# Patient Record
Sex: FEMALE | Race: OTHER
[De-identification: names, ages, dates, MRNs, and addresses within clinical notes are randomized per-mention and may not be internally consistent; named-entity substitution may affect disease eponyms.]

---

## 2018-01-27 ENCOUNTER — HOSPITAL ENCOUNTER (EMERGENCY)
Dept: HOSPITAL 25 - ED | Age: 28
Discharge: HOME | End: 2018-01-27
Payer: COMMERCIAL

## 2018-01-27 VITALS — SYSTOLIC BLOOD PRESSURE: 126 MMHG | DIASTOLIC BLOOD PRESSURE: 80 MMHG
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
## 2018-01-27 DIAGNOSIS — S82.301A: Primary | ICD-10-CM

## 2018-01-27 DIAGNOSIS — S82.831A: ICD-10-CM

## 2018-01-27 LAB
BASOPHILS # BLD AUTO: 0 10^3/UL (ref 0–0.2)
EOSINOPHIL # BLD AUTO: 0 10^3/UL (ref 0–0.6)
HCT VFR BLD AUTO: 43 % (ref 35–47)
HGB BLD-MCNC: 14.1 G/DL (ref 12–16)
LYMPHOCYTES # BLD AUTO: 1.8 10^3/UL (ref 1–4.8)
MCH RBC QN AUTO: 29 PG (ref 27–31)
MCHC RBC AUTO-ENTMCNC: 33 G/DL (ref 31–36)
MCV RBC AUTO: 88 FL (ref 80–97)
MONOCYTES # BLD AUTO: 0.6 10^3/UL (ref 0–0.8)
NEUTROPHILS # BLD AUTO: 9.3 10^3/UL (ref 1.5–7.7)
NRBC # BLD AUTO: 0 10^3/UL
NRBC BLD QL AUTO: 0
PLATELET # BLD AUTO: 213 10^3/UL (ref 150–450)
RBC # BLD AUTO: 4.83 10^6/UL (ref 4–5.4)
WBC # BLD AUTO: 11.7 10^3/UL (ref 3.5–10.8)

## 2018-01-27 PROCEDURE — 80053 COMPREHEN METABOLIC PANEL: CPT

## 2018-01-27 PROCEDURE — 27824 TREAT LOWER LEG FRACTURE: CPT

## 2018-01-27 PROCEDURE — 36415 COLL VENOUS BLD VENIPUNCTURE: CPT

## 2018-01-27 PROCEDURE — 96374 THER/PROPH/DIAG INJ IV PUSH: CPT

## 2018-01-27 PROCEDURE — 96375 TX/PRO/DX INJ NEW DRUG ADDON: CPT

## 2018-01-27 PROCEDURE — 85025 COMPLETE CBC W/AUTO DIFF WBC: CPT

## 2018-01-27 PROCEDURE — 99283 EMERGENCY DEPT VISIT LOW MDM: CPT

## 2018-01-27 PROCEDURE — 96376 TX/PRO/DX INJ SAME DRUG ADON: CPT

## 2018-01-27 NOTE — RAD
HISTORY: Right ankle pain, right leg deformity, trauma



COMPARISONS: None



VIEWS: 6, Frontal, lateral, and oblique views of the right ankle with frontal and lateral

views of the right foreleg



FINDINGS:



BONE DENSITY: There is an oblique, comminuted, and angulated fracture of the distal tibial

metaphysis with articular extension. There is transverse fracture through the distal

fibula. The tibial fibular interval is normal. There is no oblique displaced fracture of

the proximal fibula.

BONES: There is no displaced fracture.    

JOINTS: There is no arthropathy.    

ALIGNMENT: There is no dislocation. 

SOFT TISSUES: Unremarkable.



OTHER FINDINGS: None.



IMPRESSION: 

1.  OBLIQUE COMMINUTED ANGULATED FRACTURE OF THE DISTAL TIBIA WITH ARTICULAR EXTENSION.

2.  NONDISPLACED FRACTURE OF THE DISTAL FIBULA.

3.  DISPLACED FRACTURE OF THE PROXIMAL FIBULA.

## 2018-01-27 NOTE — ED
Lower Extremity





- HPI Summary


HPI Summary: 


27F presents with right leg pain and deformity.  She states she collided with 

her boyfriend and that her leg twisted behind her while ice skating.  She 

denies any previous injury to the area.  She admits to some tingling but denies 

any numbness.  She denies any knee pain.  She states the pain is mostly in her 

right shin.  She is in 10/10 pain.  She has not taken anything for her pain.  

She denies any head injury.  She denies any chest pain abdominal pain or any 

other extremity pain.  She is a student. 








- History of Current Complaint


Chief Complaint: EDExtremityLower


Stated Complaint: POSSIBLE BROKEN RT ANKLE


Time Seen by Provider: 01/27/18 17:51


Hx Last Menstrual Period: 20 days ago


Pain Intensity: 7





- Allergies/Home Medications


Allergies/Adverse Reactions: 


 Allergies











Allergy/AdvReac Type Severity Reaction Status Date / Time


 


Bee Venom Allergy Intermediate Nausea And Verified 11/08/16 06:47





   Vomiting  


 


No Known Drug Allergy Allergy  Unknown Verified 11/08/16 06:47





   Reaction  





   Details  














PMH/Surg Hx/FS Hx/Imm Hx


Endocrine/Hematology History: 


   Denies: Hx Anticoagulant Therapy


Respiratory History: Reports: Hx Asthma - states as child until 10 years old no 

meds for


Sensory History: Reports: Hx Contacts or Glasses - pt wears contacts and has 

glasses


   Denies: Hx Hearing Aid


Opthamlomology History: Reports: Hx Contacts or Glasses - pt wears contacts and 

has glasses





- Surgical History


Surgery Procedure, Year, and Place: wisdom teeth removed 2 years ago


Hx Anesthesia Reactions: No


Infectious Disease History: No


Infectious Disease History: 


   Denies: Hx Clostridium Difficile, Hx Hepatitis, Hx Human Immunodeficiency 

Virus (HIV), Hx of Known/Suspected MRSA, Hx Shingles, Hx Tuberculosis, Hx Known/

Suspected VRE, Hx Known/Suspected VRSA, History Other Infectious Disease, 

Traveled Outside the [place] in Last 30 Days





- Family History


Known Family History: Positive: None





- Social History


Alcohol Use: None


Substance Use Type: Reports: None


Smoking Status (MU): Never Smoked Tobacco





Review of Systems


Negative: Fever


Negative: Chest Pain


Negative: Shortness Of Breath


Positive: Myalgia - right lower leg pain, Edema


All Other Systems Reviewed And Are Negative: Yes





Physical Exam


Triage Information Reviewed: Yes


Vital Signs On Initial Exam: 


 Initial Vitals











Temp Pulse Resp BP Pulse Ox


 


 97.9 F   88   20   126/80   98 


 


 01/27/18 17:26  01/27/18 17:26  01/27/18 17:26  01/27/18 17:26  01/27/18 17:26











Vital Signs Reviewed: Yes


Appearance: Positive: Pain Distress


Skin: Positive: Warm, Dry


Head/Face: Positive: Normal Head/Face Inspection


Eyes: Positive: Normal, Conjunctiva Clear


Respiratory/Lung Sounds: Positive: Clear to Auscultation, Breath Sounds Present


Cardiovascular: Positive: Normal, RRR


Musculoskeletal: Positive: Limited @ - right lower leg, Edema Right - lower leg

, Other - deformity to right lower leg, good pulses, capillary refill<2 secs, 

sensation grossly intact


Neurological: Positive: Normal


Psychiatric: Positive: Normal





Diagnostics





- Vital Signs


 Vital Signs











  Temp Pulse Resp BP Pulse Ox


 


 01/27/18 18:34    20  


 


 01/27/18 18:07    18  


 


 01/27/18 17:26  97.9 F  88  20  126/80  98














- Laboratory


Lab Results: 


 Lab Results











  01/27/18 01/27/18 Range/Units





  18:09 18:09 


 


WBC  11.7 H   (3.5-10.8)  10^3/ul


 


RBC  4.83   (4.0-5.4)  10^6/ul


 


Hgb  14.1   (12.0-16.0)  g/dl


 


Hct  43   (35-47)  %


 


MCV  88   (80-97)  fL


 


MCH  29   (27-31)  pg


 


MCHC  33   (31-36)  g/dl


 


RDW  13   (10.5-15)  %


 


Plt Count  213   (150-450)  10^3/ul


 


MPV  9   (7.4-10.4)  um3


 


Neut % (Auto)  79.4   (38-83)  %


 


Lymph % (Auto)  15.3 L   (25-47)  %


 


Mono % (Auto)  4.9   (1-9)  %


 


Eos % (Auto)  0.3   (0-6)  %


 


Baso % (Auto)  0.1   (0-2)  %


 


Absolute Neuts (auto)  9.3 H   (1.5-7.7)  10^3/ul


 


Absolute Lymphs (auto)  1.8   (1.0-4.8)  10^3/ul


 


Absolute Monos (auto)  0.6   (0-0.8)  10^3/ul


 


Absolute Eos (auto)  0   (0-0.6)  10^3/ul


 


Absolute Basos (auto)  0   (0-0.2)  10^3/ul


 


Absolute Nucleated RBC  0   10^3/ul


 


Nucleated RBC %  0   


 


Sodium   136  (133-145)  mmol/L


 


Potassium   3.2 L  (3.5-5.0)  mmol/L


 


Chloride   101  (101-111)  mmol/L


 


Carbon Dioxide   23  (22-32)  mmol/L


 


Anion Gap   12 H  (2-11)  mmol/L


 


BUN   13  (6-24)  mg/dL


 


Creatinine   0.83  (0.51-0.95)  mg/dL


 


Est GFR ( Amer)   106.1  (>60)  


 


Est GFR (Non-Af Amer)   82.5  (>60)  


 


BUN/Creatinine Ratio   15.7  (8-20)  


 


Glucose   112 H  ()  mg/dL


 


Calcium   9.9  (8.6-10.3)  mg/dL


 


Total Bilirubin   0.40  (0.2-1.0)  mg/dL


 


AST   16  (13-39)  U/L


 


ALT   8  (7-52)  U/L


 


Alkaline Phosphatase   54  ()  U/L


 


Total Protein   7.9  (6.4-8.9)  g/dL


 


Albumin   4.8  (3.2-5.2)  g/dL


 


Globulin   3.1  (2-4)  g/dL


 


Albumin/Globulin Ratio   1.5  (1-3)  











Result Diagrams: 


 01/27/18 18:09





 01/27/18 18:09


Lab Statement: Any lab studies that have been ordered have been reviewed, and 

results considered in the medical decision making process.





- Radiology


  ** leg


Xray Interpretation: Positive (See Comments) - IMPRESSION: 1. OBLIQUE 

COMMINUTED ANGULATED FRACTURE OF THE DISTAL TIBIA WITH ARTICULAR EXTENSION. 2. 

NONDISPLACED FRACTURE OF THE DISTAL FIBULA. 3. DISPLACED FRACTURE OF THE 

PROXIMAL FIBULA.


Radiology Interpretation Completed By: Radiologist





Lower Extremity Course/Dx





- Course


Course Of Treatment: 27F presents with right leg pain and deformity.  She 

states she collided with her boyfriend and that her leg twisted behind her 

while ice skating.  She denies any previous injury to the area.  She admits to 

some tingling but denies any numbness.  She denies any knee pain.  She states 

the pain is mostly in her right shin.  She is in 10/10 pain.  She has not taken 

anything for her pain.  She denies any head injury.  She denies any chest pain 

abdominal pain or any other extremity pain.  On exam neurovascularly intact.  

Has deformities her right ankle.  X-ray shows spinal fracture of right lower 

leg.  Dr. Doty came in and reduced fracture and placed splint.  Patient will 

follow-up with Dr. Doty.  Will give pain medication and have follow rice.  

Patient understands and agrees with plan.





- Diagnoses


Differential Diagnosis/HQI/PQRI: Positive: Fracture (Closed), Sprain, Strain


Provider Diagnoses: 


 Fracture of right tibia and fibula








Discharge





- Discharge Plan


Condition: Good


Disposition: HOME


Prescriptions: 


oxyCODONE/Acetamin 5/325 MG* [Percocet 5/325 TAB*] 1 tab PO Q6H PRN #20 tab MDD 

4


 PRN Reason: Pain


Patient Education Materials:  Leg Fracture (ED)


Referrals: 


[facility] - Soren MONTEJO [Primary Care Provider] - 


Santhosh Doty MD [Medical Doctor] - 


Additional Instructions: 


Use crutches and stay nonweight bearing


Keep splint on area and keep dry


Call ortho office  o set up appointment for follow up on Tuesday


Use ibuprofen for pain every 6 hours and use narcotic for breakthrough pain


Ice, elevate


Return to ED if develop numbness or tingling or any new or worsening symptoms

## 2018-01-27 NOTE — RAD
HISTORY: Post reduction



COMPARISONS: January 27, 2018 at 6:05 PM



VIEWS: 3, Frontal and lateral views of the right foreleg



FINDINGS:



BONE DENSITY: Normal.

BONES: Again noted is comminuted fracture of the distal tibia with articular extension.

There has been interval reduction of the displacement of the proximal fibular fracture.

Again noted is a nondisplaced distal fibular fracture.    

JOINTS: There is no arthropathy.    

ALIGNMENT: There is no dislocation. 

SOFT TISSUES: Unremarkable.



OTHER FINDINGS: None.



IMPRESSION: 

AGAIN NOTED ARE FRACTURES OF THE PROXIMAL AND DISTAL FIBULA AND OF THE DISTAL TIBIA. THERE

IS BEEN INTERVAL REDUCTION OF THE PROXIMAL FIBULAR FRACTURE DISPLACEMENT

## 2019-04-26 ENCOUNTER — HOSPITAL ENCOUNTER (EMERGENCY)
Dept: HOSPITAL 25 - ED | Age: 29
Discharge: HOME | End: 2019-04-26
Payer: COMMERCIAL

## 2019-04-26 VITALS — DIASTOLIC BLOOD PRESSURE: 57 MMHG | SYSTOLIC BLOOD PRESSURE: 115 MMHG

## 2019-04-26 DIAGNOSIS — R06.02: Primary | ICD-10-CM

## 2019-04-26 LAB
ALBUMIN SERPL BCG-MCNC: 4.4 G/DL (ref 3.2–5.2)
ALBUMIN/GLOB SERPL: 1.6 {RATIO} (ref 1–3)
ALP SERPL-CCNC: 58 U/L (ref 34–104)
ALT SERPL W P-5'-P-CCNC: 6 U/L (ref 7–52)
ANION GAP SERPL CALC-SCNC: 6 MMOL/L (ref 2–11)
AST SERPL-CCNC: 11 U/L (ref 13–39)
BASOPHILS # BLD AUTO: 0 10^3/UL (ref 0–0.2)
BUN SERPL-MCNC: 16 MG/DL (ref 6–24)
BUN/CREAT SERPL: 18.6 (ref 8–20)
CALCIUM SERPL-MCNC: 9.3 MG/DL (ref 8.6–10.3)
CHLORIDE SERPL-SCNC: 104 MMOL/L (ref 101–111)
EOSINOPHIL # BLD AUTO: 0.2 10^3/UL (ref 0–0.6)
GLOBULIN SER CALC-MCNC: 2.7 G/DL (ref 2–4)
GLUCOSE SERPL-MCNC: 93 MG/DL (ref 70–100)
HCG SERPL QL: < 0.6 MIU/ML
HCO3 SERPL-SCNC: 26 MMOL/L (ref 22–32)
HCT VFR BLD AUTO: 40 % (ref 33–41)
HGB BLD-MCNC: 13.2 G/DL (ref 12–16)
LYMPHOCYTES # BLD AUTO: 2.5 10^3/UL (ref 1–4.8)
MAGNESIUM SERPL-MCNC: 2.2 MG/DL (ref 1.9–2.7)
MCH RBC QN AUTO: 29 PG (ref 27–31)
MCHC RBC AUTO-ENTMCNC: 33 G/DL (ref 31–36)
MCV RBC AUTO: 87 FL (ref 80–97)
MONOCYTES # BLD AUTO: 0.6 10^3/UL (ref 0–0.8)
NEUTROPHILS # BLD AUTO: 3.8 10^3/UL (ref 1.5–7.7)
NRBC # BLD AUTO: 0 10^3/UL
NRBC BLD QL AUTO: 0.1
PLATELET # BLD AUTO: 217 10^3/UL (ref 150–450)
POTASSIUM SERPL-SCNC: 4 MMOL/L (ref 3.5–5)
PROT SERPL-MCNC: 7.1 G/DL (ref 6.4–8.9)
RBC # BLD AUTO: 4.61 10^6 /UL (ref 3.7–4.87)
SODIUM SERPL-SCNC: 136 MMOL/L (ref 135–145)
TSH SERPL-ACNC: 3.44 MCIU/ML (ref 0.34–5.6)
WBC # BLD AUTO: 7.1 10^3/UL (ref 3.5–10.8)

## 2019-04-26 PROCEDURE — 83735 ASSAY OF MAGNESIUM: CPT

## 2019-04-26 PROCEDURE — 36415 COLL VENOUS BLD VENIPUNCTURE: CPT

## 2019-04-26 PROCEDURE — 86140 C-REACTIVE PROTEIN: CPT

## 2019-04-26 PROCEDURE — 80053 COMPREHEN METABOLIC PANEL: CPT

## 2019-04-26 PROCEDURE — 93005 ELECTROCARDIOGRAM TRACING: CPT

## 2019-04-26 PROCEDURE — 84702 CHORIONIC GONADOTROPIN TEST: CPT

## 2019-04-26 PROCEDURE — 71275 CT ANGIOGRAPHY CHEST: CPT

## 2019-04-26 PROCEDURE — 84443 ASSAY THYROID STIM HORMONE: CPT

## 2019-04-26 PROCEDURE — 99282 EMERGENCY DEPT VISIT SF MDM: CPT

## 2019-04-26 PROCEDURE — 85025 COMPLETE CBC W/AUTO DIFF WBC: CPT
